# Patient Record
Sex: MALE | Race: OTHER | NOT HISPANIC OR LATINO | URBAN - METROPOLITAN AREA
[De-identification: names, ages, dates, MRNs, and addresses within clinical notes are randomized per-mention and may not be internally consistent; named-entity substitution may affect disease eponyms.]

---

## 2018-09-03 ENCOUNTER — EMERGENCY (EMERGENCY)
Facility: HOSPITAL | Age: 23
LOS: 1 days | Discharge: HOME | End: 2018-09-03
Attending: EMERGENCY MEDICINE | Admitting: EMERGENCY MEDICINE
Payer: MEDICAID

## 2018-09-03 VITALS
TEMPERATURE: 99 F | WEIGHT: 227.96 LBS | HEART RATE: 69 BPM | RESPIRATION RATE: 16 BRPM | SYSTOLIC BLOOD PRESSURE: 113 MMHG | HEIGHT: 67 IN | OXYGEN SATURATION: 100 % | DIASTOLIC BLOOD PRESSURE: 74 MMHG

## 2018-09-03 PROCEDURE — 99283 EMERGENCY DEPT VISIT LOW MDM: CPT

## 2018-09-03 RX ORDER — IBUPROFEN 200 MG
600 TABLET ORAL ONCE
Qty: 0 | Refills: 0 | Status: COMPLETED | OUTPATIENT
Start: 2018-09-03 | End: 2018-09-03

## 2018-09-03 RX ORDER — CYCLOBENZAPRINE HYDROCHLORIDE 10 MG/1
1 TABLET, FILM COATED ORAL
Qty: 9 | Refills: 0 | OUTPATIENT
Start: 2018-09-03 | End: 2018-09-05

## 2018-09-03 RX ADMIN — Medication 600 MILLIGRAM(S): at 10:05
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
## 2018-09-03 NOTE — ED PROVIDER NOTE - ENMT, MLM
Head atraumatic. Small abrasion to bridge of nose. No septal hematoma, no epistaxis, no nasal displacement. Oropharynx normal.

## 2018-09-03 NOTE — ED PROVIDER NOTE - MUSCULOSKELETAL, MLM
FROM throughout. Mild lumbar paraspinal muscular tenderness but no c/t/l spine tenderness, step off, deformity. Extremities atraumatic.

## 2020-09-22 PROBLEM — Z00.00 ENCOUNTER FOR PREVENTIVE HEALTH EXAMINATION: Status: ACTIVE | Noted: 2020-09-22

## 2020-10-13 NOTE — HISTORY OF PRESENT ILLNESS
[FreeTextEntry1] : Jacky is a 25 year old self referred male with Obsessive-Compulsive Disorder (OCD).  He has always had some OCD behaviors but it became more concerning for him in 2015.  He was evaluated by a psychiatrist at [facility] at that time and was officially diagnosed with OCD.  He claims to "over think" things so much that he has difficulty being in the "present".   An MRI of the brain and "blood work" were normal.  Jacky has been on Fluoxetine and this has somewhat helped his OCD thoughts.  However, he recently had a "hypochondriac episode" where he became overly concerned about a hypopigmented lesion on his knee.  He was seen by a dermatologist who was not concerned with the birthmark.  However, Jacky continued to worry about it.  He researched conditions which could be associated with hypopigmentation and requested a Medical Genetics evaluation.  He is particularly concerned about the possibility of a mosaic sex chromosome abnormality.\par \par Jacky's early growth and development were normal.  He did not have any learning problems, although he did have an occasional  for subjects where he had some difficulty.  He is a college graduate and works in [employer].  He has been in good health, with no major medical problems, hospitalizations or surgeries.  He received stitches for a head injury on one occasion.  He wears glasses for myopia.\par \par

## 2020-10-13 NOTE — FAMILY HISTORY
[FreeTextEntry1] : Jacky has a 23 year old brother who has asthma but is otherwise healthy.  His mother and father both have some areas of skin hypopigmentation.  They are both in good health.  Jacky' maternal grandmother had hypopigmented changes on her hands as she got older.  Jacky' maternal aunt has Down syndrome.  The family history is otherwise unremarkable.  His mother's family is from [place] and his father's family is from [facility] [facility] [place].  The couple are non-consanguineous.

## 2020-10-13 NOTE — REASON FOR VISIT
[Initial - Scheduled] : [unfilled]  is being seen for  ~M an initial scheduled visit [FreeTextEntry3] : He is being seen due to concerns about areas of hypopigmentation.\par \par Patient was seen with Genetic Counselor, Delmi Pierson\par DRAFT WRITTEN PRIOR TO PATIENTS APPT

## 2020-10-13 NOTE — SOCIAL HISTORY
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

## 2020-10-15 ENCOUNTER — APPOINTMENT (OUTPATIENT)
Dept: PEDIATRIC MEDICAL GENETICS | Facility: CLINIC | Age: 25
End: 2020-10-15
